# Patient Record
Sex: FEMALE | URBAN - METROPOLITAN AREA
[De-identification: names, ages, dates, MRNs, and addresses within clinical notes are randomized per-mention and may not be internally consistent; named-entity substitution may affect disease eponyms.]

---

## 2019-01-14 ENCOUNTER — APPOINTMENT (RX ONLY)
Dept: URBAN - METROPOLITAN AREA CLINIC 87 | Facility: CLINIC | Age: 69
Setting detail: DERMATOLOGY
End: 2019-01-14

## 2019-01-14 DIAGNOSIS — L91.0 HYPERTROPHIC SCAR: ICD-10-CM

## 2019-01-14 PROBLEM — M12.9 ARTHROPATHY, UNSPECIFIED: Status: ACTIVE | Noted: 2019-01-14

## 2019-01-14 PROBLEM — I10 ESSENTIAL (PRIMARY) HYPERTENSION: Status: ACTIVE | Noted: 2019-01-14

## 2019-01-14 PROCEDURE — ? OTHER

## 2019-01-14 PROCEDURE — 99201: CPT

## 2019-01-14 PROCEDURE — ? PRESCRIPTION

## 2019-01-14 PROCEDURE — ? COUNSELING

## 2019-01-14 RX ORDER — MUPIROCIN 20 MG/G
OINTMENT TOPICAL
Qty: 1 | Refills: 0 | Status: ERX | COMMUNITY
Start: 2019-01-14

## 2019-01-14 RX ORDER — CEPHALEXIN 500 MG/1
CAPSULE ORAL
Qty: 14 | Refills: 0 | Status: ERX | COMMUNITY
Start: 2019-01-14

## 2019-01-14 RX ADMIN — CEPHALEXIN: 500 CAPSULE ORAL at 20:58

## 2019-01-14 RX ADMIN — MUPIROCIN: 20 OINTMENT TOPICAL at 20:53

## 2019-01-14 NOTE — PROCEDURE: OTHER
Note Text (......Xxx Chief Complaint.): This diagnosis correlates with the
Other (Free Text): Consider ILK once area has healed
Detail Level: Zone

## 2019-01-28 ENCOUNTER — APPOINTMENT (RX ONLY)
Dept: URBAN - METROPOLITAN AREA CLINIC 87 | Facility: CLINIC | Age: 69
Setting detail: DERMATOLOGY
End: 2019-01-28

## 2019-01-28 DIAGNOSIS — L91.0 HYPERTROPHIC SCAR: ICD-10-CM

## 2019-01-28 PROCEDURE — ? OTHER

## 2019-01-28 PROCEDURE — ? PRESCRIPTION

## 2019-01-28 PROCEDURE — 99213 OFFICE O/P EST LOW 20 MIN: CPT

## 2019-01-28 PROCEDURE — ? COUNSELING

## 2019-01-28 RX ORDER — SULFAMETHOXAZOLE AND TRIMETHOPRIM 800; 160 MG/1; MG/1
TABLET ORAL
Qty: 14 | Refills: 0 | Status: ERX | COMMUNITY
Start: 2019-01-28

## 2019-01-28 RX ADMIN — SULFAMETHOXAZOLE AND TRIMETHOPRIM: 800; 160 TABLET ORAL at 15:11

## 2019-01-28 NOTE — PROCEDURE: OTHER
Detail Level: Zone
Other (Free Text): Drainage has improved but ILK not the best option for today. Recommends to have excised with Dr. Moreno to have tissue culture. Patient completed Keflex. Recommended Bactrim BID for one week.
Note Text (......Xxx Chief Complaint.): This diagnosis correlates with the